# Patient Record
Sex: FEMALE | Race: WHITE
[De-identification: names, ages, dates, MRNs, and addresses within clinical notes are randomized per-mention and may not be internally consistent; named-entity substitution may affect disease eponyms.]

---

## 2019-11-01 ENCOUNTER — HOSPITAL ENCOUNTER (OUTPATIENT)
Dept: HOSPITAL 46 - OPS | Age: 64
Discharge: HOME | End: 2019-11-01
Attending: SURGERY
Payer: COMMERCIAL

## 2019-11-01 VITALS — WEIGHT: 161 LBS | HEIGHT: 62 IN | BODY MASS INDEX: 29.63 KG/M2

## 2019-11-01 DIAGNOSIS — Z12.11: Primary | ICD-10-CM

## 2019-11-01 DIAGNOSIS — E78.5: ICD-10-CM

## 2019-11-01 DIAGNOSIS — E03.9: ICD-10-CM

## 2019-11-01 PROCEDURE — G0500 MOD SEDAT ENDO SERVICE >5YRS: HCPCS

## 2019-11-01 PROCEDURE — 0DJD8ZZ INSPECTION OF LOWER INTESTINAL TRACT, VIA NATURAL OR ARTIFICIAL OPENING ENDOSCOPIC: ICD-10-PCS | Performed by: SURGERY

## 2019-11-01 NOTE — NUR
11/01/19 1045 Sheets,Concha
1038 PT ARRIVED TO PACU ON 3L VIA [place], PT DENIES NAUSEA AND PAIN.
RESP EVEN AND UNLABORED.
 
1042 MD AT BEDSIDE TALKING TO PT. PT WAKES EASILY TO VERBAL STIMULI.
 
1043 O2 REMOVED.
 
1044 PT ROLLED TO BACK AND HEAD OF BED INCREASED. PT DENIES DIZZINESS
AND REPORTS "FEEL GOOD."

## 2019-11-02 NOTE — OR
[facility]
                                    [address], [place]  [address]
_________________________________________________________________________________________
                                                                 Signed   
 
 
DATE OF OPERATION:
11/01/2019
 
SURGEON:
Hyun Rivera MD
 
PREOPERATIVE DIAGNOSIS:
Colon screening.
 
POSTOPERATIVE DIAGNOSIS:
Normal colon to cecum.
 
PROCEDURE:
Total colonoscopy to cecum.
 
ANESTHESIA:
Intravenous sedation, fentanyl 100 mcg, Versed 3 mg.
 
INDICATION:
This 63-year-old white woman is a patient of Dr. Nikhil Kramer and underwent colonoscopy
13 years ago, which was normal.  She has no symptoms of bleeding, diarrhea or
constipation, and no family history of colon cancer.  She is here for a screening
colonoscopy.  The risks of bleeding, infection, and perforation related to colonoscopy
was reviewed with her.  She understands and wished to proceed. 
 
FINDINGS:
The prep was excellent.  Complete colonoscopy was undertaken to the cecum without
question.  There was no sign of polyps, diverticular formation, colitis, or cancer. 
 
DESCRIPTION OF PROCEDURE:
The patient was brought to the endoscopy suite and placed in lateral decubitus position,
given intravenous sedation to the point of slurred speech and nystagmus.  Perioperative
antibiotics were given on the basis of prior knee replacement.  After satisfactory
intravenous sedation, digital rectal examination was undertaken showing no anorectal
abnormality. 
 
An Olympus video colonoscope was passed in the rectum and manipulated throughout the
colon ultimately intubating the cecum itself.  The ileocecal valve and appendiceal
orifice were normal.  The scope was withdrawn from that point and examination throughout
showed no sign of abnormality; specifically, no polyps, diverticular formation, colitis,
or cancer.  Retroflexed view was normal as well.  The scope was removed and the patient
was taken to recovery room in good condition. 
 
    Electronically Signed By: HYUN RIVERA MD  11/02/19 1317
_________________________________________________________________________________________
PATIENT NAME:     VERA JAVIER                       
MEDICAL RECORD #: [id number]            OPERATIVE REPORT              
          ACCT #: [id number]  
DATE OF BIRTH:   [birth year]            REPORT #: [id number]      
PHYSICIAN:        HYUN RIVERA MD                 
PCP:              NIKHIL KRAMER DO               
REPORT IS CONFIDENTIAL AND NOT TO BE RELEASED WITHOUT AUTHORIZATION
 
 
                                  [facility]
                                    [address], [place]  [address]
_________________________________________________________________________________________
                                                                 Signed   
 
 
 
CONCLUDING DIAGNOSIS:
Normal colon to cecum.
 
PLAN:
Recommend repeat colonoscopy in 10 years sooner if clinically indicated.  She will
return to the ongoing care of Dr. Kramer. 
 
 
 
            ________________________________________
            MD SAMANTHA Vaughn/MODL
Job #:  [id number]
DD:  11/01/2019 10:40:07
DT:  11/01/2019 10:54:53
 
cc:            Nikhil Kramer DO
 
 
Copies:  NIKHIL KRAMER DO
~
 
 
 
 
 
 
 
 
 
 
 
 
 
 
 
 
 
 
 
    Electronically Signed By: HYUN RIVERA MD  11/02/19 1317
_________________________________________________________________________________________
PATIENT NAME:     VERA JAVIER                       
MEDICAL RECORD #: [id number]            OPERATIVE REPORT              
          ACCT #: [id number]  
DATE OF BIRTH:   [birth year]            REPORT #: [id number]      
PHYSICIAN:        HYUN RIVERA MD                 
PCP:              NIKHIL KRAMER DO               
REPORT IS CONFIDENTIAL AND NOT TO BE RELEASED WITHOUT AUTHORIZATION